# Patient Record
Sex: FEMALE | Race: BLACK OR AFRICAN AMERICAN | NOT HISPANIC OR LATINO | Employment: OTHER | URBAN - METROPOLITAN AREA
[De-identification: names, ages, dates, MRNs, and addresses within clinical notes are randomized per-mention and may not be internally consistent; named-entity substitution may affect disease eponyms.]

---

## 2017-11-02 PROBLEM — N17.9 ARF (ACUTE RENAL FAILURE): Status: ACTIVE | Noted: 2017-11-02

## 2017-11-02 PROBLEM — I10 ESSENTIAL HYPERTENSION: Status: ACTIVE | Noted: 2017-11-02

## 2017-11-02 PROBLEM — E87.5 HYPERKALEMIA: Status: ACTIVE | Noted: 2017-11-02

## 2017-11-02 PROBLEM — N93.9 VAGINAL BLEEDING: Status: ACTIVE | Noted: 2017-11-02

## 2017-11-03 PROBLEM — I95.9 HYPOTENSION: Status: ACTIVE | Noted: 2017-11-03

## 2017-11-03 PROBLEM — N17.9 AKI (ACUTE KIDNEY INJURY): Status: ACTIVE | Noted: 2017-11-03

## 2017-11-04 PROBLEM — E87.6 HYPOKALEMIA: Status: ACTIVE | Noted: 2017-11-04

## 2017-11-05 PROBLEM — I47.10 SVT (SUPRAVENTRICULAR TACHYCARDIA): Status: ACTIVE | Noted: 2017-11-05

## 2017-11-06 PROBLEM — N93.9 VAGINAL BLEEDING: Status: RESOLVED | Noted: 2017-11-02 | Resolved: 2017-11-06

## 2017-11-06 PROBLEM — N17.9 ARF (ACUTE RENAL FAILURE): Status: RESOLVED | Noted: 2017-11-02 | Resolved: 2017-11-06

## 2017-11-06 PROBLEM — E87.5 HYPERKALEMIA: Status: RESOLVED | Noted: 2017-11-02 | Resolved: 2017-11-06

## 2018-01-29 PROBLEM — C54.1 ENDOMETRIAL CANCER: Status: ACTIVE | Noted: 2018-01-29

## 2021-01-20 ENCOUNTER — IMMUNIZATION (OUTPATIENT)
Dept: FAMILY MEDICINE | Facility: CLINIC | Age: 71
End: 2021-01-20
Payer: MEDICAID

## 2021-01-20 DIAGNOSIS — Z23 NEED FOR VACCINATION: Primary | ICD-10-CM

## 2021-01-20 PROCEDURE — 91300 COVID-19, MRNA, LNP-S, PF, 30 MCG/0.3 ML DOSE VACCINE: CPT | Mod: PBBFAC,PO

## 2021-02-10 ENCOUNTER — IMMUNIZATION (OUTPATIENT)
Dept: FAMILY MEDICINE | Facility: CLINIC | Age: 71
End: 2021-02-10
Payer: MEDICARE

## 2021-02-10 DIAGNOSIS — Z23 NEED FOR VACCINATION: Primary | ICD-10-CM

## 2021-02-10 PROCEDURE — 0002A COVID-19, MRNA, LNP-S, PF, 30 MCG/0.3 ML DOSE VACCINE: CPT | Mod: PBBFAC | Performed by: INTERNAL MEDICINE

## 2021-02-10 PROCEDURE — 91300 COVID-19, MRNA, LNP-S, PF, 30 MCG/0.3 ML DOSE VACCINE: CPT | Mod: PBBFAC | Performed by: INTERNAL MEDICINE

## 2024-01-23 ENCOUNTER — DOCUMENTATION ONLY (OUTPATIENT)
Dept: ADMINISTRATIVE | Facility: OTHER | Age: 74
End: 2024-01-23
Payer: MEDICARE

## 2024-01-23 NOTE — PROGRESS NOTES
RADIATION ONCOLOGY CONSULTATION  JADA BIRD [facility]      NAME: Lili Prince    : 1950 SEX: F MR#: [id number]   DIAGNOSIS: Grade II meningioma   REFERRING PHYSICIAN: Ant Dowling Jr, MD   DATE OF CONSULTATION: 2024       IDENTIFICATION:  The patient is a 73-year-old female who presents with a newly diagnosed bifrontal grade II atypical meningioma, status post craniotomy and subtotal resection.  The patient is being seen in consultation for consideration of radiotherapy at the request of Dr. Dowling.    HISTORY OF PRESENT ILLNESS:  Of note, the patient has a history of an endometrial cancer, treated with DE/BSO alone by Dr. Colon in 2017.  She did not require any additional adjuvant treatment and reports demonstrating no evidence of disease since that time.     She reports several months of progressive decreased short-term memory, confusion and decreased balance, resulting in falls and frequent headaches.  A fall prompted her to go to [facility], with CT revealing an intracranial mass.  She was subsequently transferred to [place] where she was admitted on 10/08/2023, and diagnosed with a Strep A upper respiratory illness and was started on IV antibiotic.     Chest x-ray and CT angiogram on admission were reportedly negative.  CT of the head showed increased size of an olfactory groove meningioma compared to 2017, which the patient reports that she did not previously know about.  MRI of the brain on 10/09/2023, also not available for review, reportedly demonstrated an extra-axial mass originating from the planum sphenoidale consistent with a partially-calcified meningioma, with regional mass effect and vasogenic edema.  The patient was started on Keppra and steroids, and subsequently transferred to [facility].     She was admitted to [facility] Neuro ICU on 10/10/2023.  MRI Ste[facility] with and without gadolinium showed sinus sphenoidale meningioma measuring 4.2 x 4.4 x 3.4 cm.   The mass was described as isointense homogeneous enhancing and extra-axial arising from the planum sphenoidale and floor of the anterior cranial fossa.  There was significant bifrontal vasogenic edema, more so on the left than right, as well as anterior corpus callosum.  The mass compressed into place the inferior frontal lobes, more so on the left than right, with effacement of bilateral lateral ventricles.     On 10/12/2023, she underwent a left supraorbital craniotomy, subtotal resection and lumbar drain by Dr. Dowling.  Pathology demonstrated WHO grade II atypical meningioma.     CT of the head without contrast on 10/12/2023, 10/13/2023, and 10/16/2023, showed postoperative changes. There was decreased size over time of a 1.9 x 1.1 cm anterior interhemispheric fissure hemorrhage, as well as small left frontal hemorrhagic foci, and does not appear to have undergone a postoperative MRI during the hospitalization, and essentially discharged on 10/23/2023, to Rehab.     MRI of the brain with and without gadolinium on 12/19/2023, showed the patient to be status post left frontal craniotomy.  There was increased T2 and FLAIR signal of the left frontal lobe with a small area of increased signal extending past midline.  There is no mention of residual disease on the report.  She was seen by Dr. Dowling on 12/20/2023, who noted residual tumor in the ethmoid sinus, and recommended radiotherapy secondary to the grade II histology and gross residual disease.    REVIEW OF SYSTEMS:  The patient notes improvement in her decreased short-term memory and confusion, but does continue to note some mild impairment.  Postoperatively, she reports no further decreased balance, falls or headaches.  She denies any nausea, vomiting, focal numbness, tingling, weakness or seizure.  She does report decreased sense of smell postoperatively, as well as chronic lower extremity pain secondary to osteoarthritis.     She denies any high fevers,  shaking chills, drenching night sweats or recent weight loss.  She denies any recent changes in vision, hearing or swallowing, shortness of breath at rest, dyspnea on exertion, cough, chest pain, abdominal pain, constipation, diarrhea, bright-red blood per rectum or urinary symptoms.  All other systems reviewed and negative.    PAST MEDICAL HISTORY:  Meningioma as above; hypertension; osteoarthritis; hypercholesterolemia; history of diabetes mellitus; chronic kidney disease, stage 4; history of endometrial cancer; history of status post craniotomy and subtotal resection as above; status post DE/BSO in 2017; status post cholecystectomy; status post umbilical hernia repair; status post bilateral cataract extractions.    PAST RADIATION THERAPY:  None.    MEDICATIONS:  Iron sulfate; Lipitor; metoprolol; Norvasc; hydralazine; Keppra; docusate; omeprazole; Eliquis; magnesium oxide; rosuvastatin; sodium bicarbonate; calcitriol; hyoscyamine.    ALLERGIES:  No known drug allergies.    FAMILY HISTORY:  Sister with breast cancer.  No family history of brain tumor.    SOCIAL HISTORY:  The patient has a 70-pack-year history of smoking cigarettes, and reports quitting in 1989.  She does not drink alcohol and denies illicit drug use.  She lives in [place] with her daughter.  She is  and has six biological children.  In the past, she worked at a [employer] business and at the [employer].    PHYSICAL EXAMINATION:  VITAL SIGNS:  Blood pressure 128/94, heart rate 61, temperature 97.5, height 5 feet 0 inches, weight 256 pounds, oxygen saturation 99% on room air.  ECOG score of 1.   GENERAL:  The patient is a pleasant well-nourished, well-developed  female in no acute distress.   HEENT:  Plus well-healed 14 cm bifrontal craniotomy scar.  Extraocular muscles intact.  Pupils equally round and reactive to light.  Sclerae anicteric.  Oral cavity and oropharynx are clear without erythema, exudate,  masses or ulcerations.   NECK:  Supple without lymphadenopathy or thyromegaly.   HEART:  Regular rate and rhythm.  Normal S1, S2.  No murmurs, gallops or rubs.   LUNGS:  Distant breath sounds bilaterally.  No wheezes, rhonchi or rales.   BACK:  No spinal or costovertebral angle tenderness.   ABDOMEN:  Obese, soft, nontender and nondistended.  No masses or hepatosplenomegaly.   EXTREMITIES:  Warm and well perfused.  No clubbing, cyanosis or edema.   NEUROLOGIC:  Cranial nerves two through 12 grossly intact.  Motor 5/5 bilaterally, upper and lower extremities.  Sensation intact to light touch and pressure bilaterally, upper and lower extremities.  Finger-nose-finger and gait within normal limits.  Multi with heel-to-toe gait.  1+ patellar deep tendon reflexes bilaterally.  Rapid alternating movements within normal limits.    LABORATORIES:  None available.    ASSESSMENT AND PLAN:  The patient is a 73-year-old female who presents with a newly diagnosed bifrontal grade II atypical meningioma, status post craniotomy and subtotal resection.     Based upon this patient's history and presentation, I believe that she is an appropriate candidate for external beam radiation therapy.  We briefly discussed the diagnosis of meningioma and its overall management.     The patient has appropriately undergone a craniotomy and maximal safe resection for the symptomatic meningioma.  By report, it was present on imaging back in 2017, not available for personal review, in which case it may have originally presented as a meningioma and transformed into more aggressive grade II disease, which recently became symptomatic with evidence of associated vasogenic edema.  The patient appears to have undergone resection of the bulk of the tumor, however, would consider it a subtotal resection given what appears to be gross residual disease, particularly inferiorly in the ethmoid sinus, although it is not reported as such on the postoperative MRI.       In Ms. Prince's particular case, I do feel that she would be an appropriate candidate for a course of postoperative external beam radiation therapy based upon the grade II histology, gross residual disease, as well as the anatomic location of the tumor and preoperative symptoms.  I explained to the patient that the addition of postoperative radiotherapy following a subtotal resection would be to increase the rate of local control.      She is aware that given the location of the tumor in the bifrontal region near the optic structures, that particular care will need to be given to minimize dose to the adjacent globes, optic nerves and chiasm.  I  would anticipate treating her with a course of intensity-modulated radiotherapy over approximately five and a half to six weeks, with the goal of potentially boosting the gross residual disease if technically safe and feasible.  As the patient is more than three months out from surgery and well healed, my recommendation is to initiate treatment as soon as she is able.     The risks, benefits, side effects, alternatives to, indications for and mechanisms of external beam radiation therapy were explained in full to the patient.  She and her daughter who accompanied her to today's visit asked multiple appropriate questions, all of which were answered to their apparent satisfaction.  This conversation included a discussion of possible short-term side effects, including but not limited to focal dermatitis, alopecia, tearing, transient worsening of neurologic symptoms and fatigue.  We also discussed the possible long-term side effects, including but not limited to residual hyperpigmentation and/or alopecia, low risk of radionecrosis, and low risk of damage to the optic structures, including eyes, optic nerves and/or optic chiasm, and low risk of damage to the cranial nerves and/or brainstem.  She agreed with the proposed treatment plan and informed consent was obtained.      Ms. Prince has been scheduled to undergo CT-guided simulation in the supine treatment position with the use of an Aquaplast mask for custom immobilization this Wednesday, 01/24/2024.  After fusion with both the preoperative and postoperative MRI imaging, a customized treatment plan will be designed, after which she would return to [facility] to undergo verification films and to initiate a course of postoperative intensity-modulated radiotherapy to the involved brain.     Thank you very much, Dr. Dowling, for this consultation and the opportunity to participate in the care of this patient.  Please do not hesitate to contact me should you have any questions, concerns and/or require additional information.        CYRUS Lopez MD